# Patient Record
Sex: FEMALE | Race: OTHER | Employment: OTHER | URBAN - METROPOLITAN AREA
[De-identification: names, ages, dates, MRNs, and addresses within clinical notes are randomized per-mention and may not be internally consistent; named-entity substitution may affect disease eponyms.]

---

## 2017-01-19 ENCOUNTER — TELEPHONE (OUTPATIENT)
Dept: INTERNAL MEDICINE CLINIC | Age: 62
End: 2017-01-19

## 2017-01-19 NOTE — TELEPHONE ENCOUNTER
Patient is scheduled for a spinal injection next Thur with Saurabh Hutson in [place]. She questions if it okay to hold her coumadin for 7 days (starting today) and when to resume her coumadin. I will fax a note to 's office as well.

## 2017-01-19 NOTE — TELEPHONE ENCOUNTER
Patient notified PCP advises she hold her coumadin for 5 days versus 7 days and resume the day after her procedure. A note will be faxed to Dr. Ariel Chilel as well.

## 2017-01-19 NOTE — TELEPHONE ENCOUNTER
----- Message from Jeff Luis E sent at 1/19/2017  1:22 PM EST -----  Regarding: Non-Urgent Medical Question  Contact: [phone]  Dr. Baird Current, I hope this finds you well. If you will, I need a note saying that it is okay if I stop the Coumadin for seven (7) days so that I may get an epidural to help with my back pain. I know the report is in [facility] but the spinal stenosis really hurts my back when walking and limits my mobility and therefore fun! [place] I am seeing Dr. Husam Valdez of the [facility]. Their FAX number is [phone]    Pending your note and insurance, I am scheduled for next Thursday, so this needs a quick response please. Thank you so very much.   Aleja Nagy

## 2017-01-24 DIAGNOSIS — S39.012S LUMBAR STRAIN, SEQUELA: ICD-10-CM

## 2017-01-24 RX ORDER — TRAMADOL HYDROCHLORIDE 50 MG/1
TABLET ORAL
Qty: 120 TAB | Refills: 2 | OUTPATIENT
Start: 2017-01-24

## 2017-02-02 DIAGNOSIS — N39.41 URGE INCONTINENCE: ICD-10-CM

## 2017-02-02 RX ORDER — DARIFENACIN HYDROBROMIDE 15 MG/1
TABLET, EXTENDED RELEASE ORAL
Qty: 90 TAB | Refills: 1 | Status: SHIPPED | OUTPATIENT
Start: 2017-02-02